# Patient Record
Sex: FEMALE | Race: WHITE | NOT HISPANIC OR LATINO | Employment: UNEMPLOYED | URBAN - METROPOLITAN AREA
[De-identification: names, ages, dates, MRNs, and addresses within clinical notes are randomized per-mention and may not be internally consistent; named-entity substitution may affect disease eponyms.]

---

## 2024-01-31 ENCOUNTER — HOSPITAL ENCOUNTER (EMERGENCY)
Facility: HOSPITAL | Age: 19
Discharge: HOME | End: 2024-01-31
Attending: GENERAL ACUTE CARE HOSPITAL
Payer: MEDICAID

## 2024-01-31 VITALS
HEIGHT: 64 IN | DIASTOLIC BLOOD PRESSURE: 95 MMHG | WEIGHT: 262 LBS | SYSTOLIC BLOOD PRESSURE: 139 MMHG | TEMPERATURE: 98 F | OXYGEN SATURATION: 100 % | HEART RATE: 98 BPM | BODY MASS INDEX: 44.73 KG/M2 | RESPIRATION RATE: 16 BRPM

## 2024-01-31 DIAGNOSIS — S73.101A SPRAIN OF RIGHT HIP, INITIAL ENCOUNTER: ICD-10-CM
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
## 2024-01-31 DIAGNOSIS — S76.011A HIP STRAIN, RIGHT, INITIAL ENCOUNTER: Primary | ICD-10-CM

## 2024-01-31 PROCEDURE — 99284 EMERGENCY DEPT VISIT MOD MDM: CPT

## 2024-01-31 RX ORDER — METHOCARBAMOL 750 MG/1
1500 TABLET, FILM COATED ORAL 3 TIMES DAILY
Qty: 30 TABLET | Refills: 0 | Status: SHIPPED | OUTPATIENT
Start: 2024-01-31 | End: 2024-02-05

## 2024-01-31 RX ORDER — DICLOFENAC SODIUM 75 MG/1
75 TABLET, DELAYED RELEASE ORAL 2 TIMES DAILY
Qty: 30 TABLET | Refills: 0 | Status: SHIPPED | OUTPATIENT
Start: 2024-01-31 | End: 2024-02-15

## 2024-01-31 NOTE — Clinical Note
"Miranda"Reji Adair was seen and treated in our emergency department on 1/31/2024.  She may return to work on 02/02/2024.       If you have any questions or concerns, please don't hesitate to call.      Franky Novoa, AMANDAP"

## 2024-02-01 NOTE — ED PROVIDER NOTES
Encounter Date: 1/31/2024       History     Chief Complaint   Patient presents with    Fall     Fall yesterday. C/o right hip pain      Patient is a 18-year-old female who presents to the emergency department with complaints of right hip pain.  She reports that this happened after a slip fall at work.  She states she was walking their some water and the legs slipped out from under her extending laterally and she almost did what she describes as the splits.  Patient has history of chronic hip pain from hip dysplasia.  Patient denies any other complaints or associated symptoms at this time.  She reports using some pain medication from her chronic issue which did help last night when the pain began but she took a dose this morning and it did not help.      Review of patient's allergies indicates:   Allergen Reactions    Amoxicillin Hives     History reviewed. No pertinent past medical history.  History reviewed. No pertinent surgical history.  History reviewed. No pertinent family history.  Social History     Tobacco Use    Smoking status: Never    Smokeless tobacco: Never   Substance Use Topics    Alcohol use: Not Currently    Drug use: Never     Review of Systems   Constitutional:  Negative for activity change, appetite change and fever.   HENT:  Negative for congestion, dental problem and sore throat.    Eyes:  Negative for discharge and itching.   Respiratory:  Negative for apnea, chest tightness and shortness of breath.    Cardiovascular:  Negative for chest pain.   Gastrointestinal:  Negative for nausea.   Genitourinary:  Negative for dysuria.   Musculoskeletal:  Positive for arthralgias, gait problem and myalgias. Negative for back pain.   Skin:  Negative for color change and rash.   Neurological:  Negative for weakness.   Hematological:  Does not bruise/bleed easily.   Psychiatric/Behavioral:  Negative for agitation and behavioral problems.    All other systems reviewed and are negative.      Physical Exam      Initial Vitals [01/31/24 1852]   BP Pulse Resp Temp SpO2   (!) 139/95 98 16 98.2 °F (36.8 °C) 100 %      MAP       --         Physical Exam    Nursing note and vitals reviewed.  Constitutional: Vital signs are normal. She appears well-developed and well-nourished.  Non-toxic appearance. She does not have a sickly appearance.   HENT:   Head: Normocephalic and atraumatic.   Eyes: Conjunctivae, EOM and lids are normal. Lids are everted and swept, no foreign bodies found.   Neck: Trachea normal and phonation normal. Neck supple. No thyroid mass and no thyromegaly present.   Normal range of motion.   Full passive range of motion without pain.     Cardiovascular:  Normal rate, regular rhythm, S1 normal, S2 normal, normal heart sounds, intact distal pulses and normal pulses.           Pulmonary/Chest: No respiratory distress.   Musculoskeletal:         General: Tenderness present. No edema.      Cervical back: Full passive range of motion without pain, normal range of motion and neck supple.      Comments: Patient is a tender with light palpation along the muscles of the right upper thigh around the hip and does have guarded range of motion but can extend the leg straight from the bed around 60°.  Right lateral extension of the knee causes the most of her pain.      Lymphadenopathy:     She has no cervical adenopathy.   Neurological: She is alert and oriented to person, place, and time. She has normal strength.   Skin: Skin is warm, dry and intact. Capillary refill takes less than 2 seconds.   Psychiatric: She has a normal mood and affect. Her speech is normal and behavior is normal. Judgment normal. Cognition and memory are normal.         ED Course   Procedures  Labs Reviewed - No data to display       Imaging Results              X-Ray Hip 2 or 3 views Right (with Pelvis when performed) (Preliminary result)  Result time 01/31/24 20:03:35      Wet Read by Franky Novoa FNP (01/31/24 20:03:35, [facility] Emergency Dept, Emergency Medicine)    Wet read:  No acute obvious bony abnormality fracture deformity seen on plain film x-ray.  Pending radiologist's review.                                     Medications - No data to display  Medical Decision Making  Patient is a 18-year-old female who presents to the emergency department with complaints of right hip pain.  She reports that this happened after a slip fall at work.  She states she was walking their some water and the legs slipped out from under her extending laterally and she almost did what she describes as the splits.  Patient has history of chronic hip pain from hip dysplasia.  Patient denies any other complaints or associated symptoms at this time.  She reports using some pain medication from her chronic issue which did help last night when the pain began but she took a dose this morning and it did not help.        Problems Addressed:  Sprain of right hip, initial encounter: acute illness or injury     Details: Patient is given IM medication and muscle relaxers to take at home for pain.  Patient had x-ray done here which shows no fracture but she does obviously have chronic right hip that is going to require surgery.  Discussed follow-up with PCP which she had so we did refer her to run.  Strict ED return precautions discussed for any change or worsening symptoms.    Amount and/or Complexity of Data Reviewed  Radiology: ordered and independent interpretation performed.    Risk  Prescription drug management.                                      Clinical Impression:  Final diagnoses:  [W19.XXXA] Fall  [S76.011A] Hip strain, right, initial encounter (Primary)  [S73.101A] Sprain of right hip, initial encounter          ED Disposition Condition    Discharge Stable          ED Prescriptions       Medication Sig Dispense Start Date End Date Auth. Provider    diclofenac (VOLTAREN) 75 MG EC tablet Take 1 tablet (75 mg total) by mouth 2 (two) times daily. for  15 days 30 tablet 1/31/2024 2/15/2024 Franky Novoa FNP    methocarbamoL (ROBAXIN) 750 MG Tab Take 2 tablets (1,500 mg total) by mouth 3 (three) times daily. for 5 days 30 tablet 1/31/2024 2/5/2024 Franky Novoa FNP          Follow-up Information       Follow up With Specialties Details Why Contact Info    Dea Cullen FNP Family Medicine, Emergency Medicine Schedule an appointment as soon as possible for a visit in 3 days For ER Follow Up. [address] [place] [address]  [phone]               Franky Novoa FNP  01/31/24 2008

## 2024-02-01 NOTE — ED PROVIDER NOTES
Encounter Date: 1/31/2024       History     Chief Complaint   Patient presents with    Fall     Fall yesterday. C/o right hip pain      HPI  Review of patient's allergies indicates:   Allergen Reactions    Amoxicillin Hives     History reviewed. No pertinent past medical history.  History reviewed. No pertinent surgical history.  History reviewed. No pertinent family history.  Social History     Tobacco Use    Smoking status: Never    Smokeless tobacco: Never   Substance Use Topics    Alcohol use: Not Currently    Drug use: Never     Review of Systems    Physical Exam     Initial Vitals [01/31/24 1852]   BP Pulse Resp Temp SpO2   (!) 139/95 98 16 98.2 °F (36.8 °C) 100 %      MAP       --         Physical Exam    ED Course   Procedures  Labs Reviewed - No data to display       Imaging Results    None          Medications - No data to display  Medical Decision Making  Amount and/or Complexity of Data Reviewed  Radiology: ordered.                                      Clinical Impression:  Final diagnoses:  [W19.XXXA] Fall

## 2024-08-08 NOTE — PROGRESS NOTES
SUBJECTIVE:  Miranda Adair is a 19 y.o. female here accompanied by friends to establish care.    HPI  Patient presents to establish care. Wants referral to gynecologist for check up. Wants to restart depression meds. Celexa used in the past without good results.  Last pap done in [place] at [facility] and was normal. Will request records. Needs Lipid panel. Needs Hep C and HIV screening. Patient is fasting. Due for tetanus and HPV vaccines-$0.   She is with headaches daily. She does describe them migraines, one side.d She is with nausea and light sensitivity. She denies relief with ibuprofen. She is with dizziness, about every other day. Occurs randomly. She does feel like she will pass out. She is with increase in blurry vision and tunnel vision. She denies abnormal vision exam. She denies hearing problems. She is with increase pain with wisdom tooth. She is need of dentist. She denies trouble swallowing. She denies shortness of breath. She does smoke cigarettes and vape daily. She denies chest pain or heart palpitations. She is with recent episodes of diarrhea and vomiting, resulting in ER visit with no abnormal findings. She is with ongoing increase in bowel movements, about 3 stools with no formed stool. She does have history of constipation. She is also with bleeding with hemorrhoid. She states having abnormal cycles, paps, and currently nexplanon. She is with ongoing bleeding weeks at a time. She is with lower quadrant pain that occurs randomly. She is with major depression. She did take celexa in past with no relief. She is wanting mental health referral.     Miranda's allergies, medications, history, and problem list were updated as appropriate.    Review of Systems   Constitutional:  Positive for fatigue.   Gastrointestinal:  Positive for abdominal pain, blood in stool, diarrhea and nausea.   Psychiatric/Behavioral:  Positive for dysphoric mood.       A comprehensive review of symptoms  "was completed and negative except as noted above.    OBJECTIVE:  Vital signs  Vitals:    08/12/24 1042   BP: 116/76   BP Location: Left arm   Patient Position: Sitting   Pulse: 102   Resp: 18   Temp: 98.2 °F (36.8 °C)   TempSrc: Temporal   SpO2: 97%   Weight: 117.5 kg (259 lb)   Height: 5' 4" (1.626 m)        Physical Exam  Vitals and nursing note reviewed.   Constitutional:       Appearance: Normal appearance.   HENT:      Head: Normocephalic and atraumatic.      Right Ear: Tympanic membrane, ear canal and external ear normal.      Left Ear: Tympanic membrane, ear canal and external ear normal.      Nose: Nose normal.      Mouth/Throat:      Mouth: Mucous membranes are moist.      Pharynx: Oropharynx is clear.   Eyes:      Extraocular Movements: Extraocular movements intact.      Conjunctiva/sclera: Conjunctivae normal.      Pupils: Pupils are equal, round, and reactive to light.   Cardiovascular:      Rate and Rhythm: Normal rate and regular rhythm.      Pulses: Normal pulses.      Heart sounds: Normal heart sounds.   Pulmonary:      Effort: Pulmonary effort is normal.      Breath sounds: Normal breath sounds.   Abdominal:      General: Abdomen is flat. Bowel sounds are normal.      Palpations: Abdomen is soft.   Musculoskeletal:         General: Normal range of motion.      Cervical back: Normal range of motion.   Skin:     General: Skin is warm and dry.      Capillary Refill: Capillary refill takes less than 2 seconds.   Neurological:      General: No focal deficit present.      Mental Status: She is alert.   Psychiatric:         Mood and Affect: Mood normal.         Behavior: Behavior normal.         Thought Content: Thought content normal.         Judgment: Judgment normal.        Office Visit on 08/12/2024   Component Date Value Ref Range Status    POC Glucose 08/12/2024 89  70 - 110 MG/DL Final          ASSESSMENT/PLAN:    1. Moderate episode of recurrent major depressive disorder  Assessment & " Plan:  Denies suicidal ideations   Willing to follow with mental health   Failed celexa in past would like to start wellbutrin       Orders:  -     buPROPion (WELLBUTRIN XL) 150 MG TB24 tablet; Take 1 tablet (150 mg total) by mouth once daily.  Dispense: 30 tablet; Refill: 11  -     Ambulatory referral/consult to Behavioral Health; Future; Expected date: 08/19/2024    2. Fatigue, unspecified type  -     POCT Glucose, Hand-Held Device  -     Vitamin D    3. Bipolar affective disorder, remission status unspecified  Assessment & Plan:  Passed diagnosis not currently taking mood stabilizer   Referral to [facility]     Orders:  -     Ambulatory referral/consult to Behavioral Health; Future; Expected date: 08/19/2024    4. PTSD (post-traumatic stress disorder)  -     Ambulatory referral/consult to Behavioral Health; Future; Expected date: 08/19/2024    5. Chronic migraine without aura without status migrainosus, not intractable  Assessment & Plan:  Nurtec samples given     Orders:  -     rimegepant (NURTEC) 75 mg odt; Take 1 tablet (75 mg total) by mouth once as needed for Migraine. Place ODT tablet on the tongue; alternatively the ODT tablet may be placed under the tongue  Dispense: 2 tablet; Refill: 0    6. Long term current use of therapeutic drug  -     CBC Auto Differential  -     Comprehensive Metabolic Panel  -     Lipid Panel  -     TSH  -     Vitamin D  -     Hemoglobin A1C    7. Chronic diarrhea    8. Screening for chlamydial disease  -     Chlamydia/GC, PCR    9. Menstrual abnormality  Assessment & Plan:  States history of ovarian cancer as well as diagnosis on problem list however in review past visit I do not see any results from pathology or imaging or evidence regarding diagnosis. She states might have been hpv positive on past pap as well as cyst. Will refer to gyn for further clarity       10. History of ovarian cancer  -     Ambulatory referral/consult to Obstetrics / Gynecology; Future; Expected date:  08/19/2024          Recent Results (from the past 24 hour(s))   POCT Glucose, Hand-Held Device    Collection Time: 08/12/24 10:50 AM   Result Value Ref Range    POC Glucose 89 70 - 110 MG/DL       Follow Up:  Follow up in about 2 weeks (around 8/26/2024) for Medication follow up.      Discussed the diagnosis, prognosis, plan of care, chronic nature of and indications for, risks and benefits of treatment for conditions.  Continue all medications as prescribed unless otherwise noted.   Call if patient develops other symptoms or if not better in 2-3 days and sooner if worse. Emphasized the importance of compliance with all recommendations, including medication use and timely follow up. Instructed to return as noted be or sooner if patient develops any other problems or if there are any other additional questions or concerns.

## 2024-08-12 ENCOUNTER — OFFICE VISIT (OUTPATIENT)
Dept: FAMILY MEDICINE | Facility: CLINIC | Age: 19
End: 2024-08-12
Payer: MEDICAID

## 2024-08-12 VITALS
RESPIRATION RATE: 18 BRPM | SYSTOLIC BLOOD PRESSURE: 116 MMHG | WEIGHT: 259 LBS | HEART RATE: 102 BPM | HEIGHT: 64 IN | BODY MASS INDEX: 44.22 KG/M2 | DIASTOLIC BLOOD PRESSURE: 76 MMHG | OXYGEN SATURATION: 97 % | TEMPERATURE: 98 F

## 2024-08-12 DIAGNOSIS — F33.1 MODERATE EPISODE OF RECURRENT MAJOR DEPRESSIVE DISORDER: Primary | ICD-10-CM

## 2024-08-12 DIAGNOSIS — F31.9 BIPOLAR AFFECTIVE DISORDER, REMISSION STATUS UNSPECIFIED: ICD-10-CM

## 2024-08-12 DIAGNOSIS — N92.6 MENSTRUAL ABNORMALITY: ICD-10-CM

## 2024-08-12 DIAGNOSIS — F43.10 PTSD (POST-TRAUMATIC STRESS DISORDER): ICD-10-CM

## 2024-08-12 DIAGNOSIS — Z85.43 HISTORY OF OVARIAN CANCER: ICD-10-CM

## 2024-08-12 DIAGNOSIS — K52.9 CHRONIC DIARRHEA: ICD-10-CM

## 2024-08-12 DIAGNOSIS — Z11.8 SCREENING FOR CHLAMYDIAL DISEASE: ICD-10-CM

## 2024-08-12 DIAGNOSIS — R53.83 FATIGUE, UNSPECIFIED TYPE: ICD-10-CM

## 2024-08-12 DIAGNOSIS — Z79.899 LONG TERM CURRENT USE OF THERAPEUTIC DRUG: ICD-10-CM

## 2024-08-12 DIAGNOSIS — G43.709 CHRONIC MIGRAINE WITHOUT AURA WITHOUT STATUS MIGRAINOSUS, NOT INTRACTABLE: ICD-10-CM

## 2024-08-12 LAB
ALBUMIN SERPL-MCNC: 4.2 G/DL (ref 3.4–5)
ALBUMIN/GLOB SERPL: 1.3 RATIO
ALP SERPL-CCNC: 98 UNIT/L (ref 50–144)
ALT SERPL-CCNC: 27 UNIT/L (ref 1–45)
ANION GAP SERPL CALC-SCNC: 10 MEQ/L (ref 2–13)
AST SERPL-CCNC: 27 UNIT/L (ref 14–36)
BASOPHILS # BLD AUTO: 0.04 X10(3)/MCL (ref 0.01–0.08)
BASOPHILS NFR BLD AUTO: 0.5 % (ref 0.1–1.2)
BILIRUB SERPL-MCNC: 0.7 MG/DL (ref 0–1)
BUN SERPL-MCNC: 14 MG/DL (ref 7–20)
CALCIUM SERPL-MCNC: 9.9 MG/DL (ref 8.4–10.2)
CHLORIDE SERPL-SCNC: 108 MMOL/L (ref 98–110)
CHOLEST SERPL-MCNC: 159 MG/DL (ref 0–200)
CO2 SERPL-SCNC: 20 MMOL/L (ref 21–32)
CREAT SERPL-MCNC: 0.62 MG/DL (ref 0.66–1.25)
CREAT/UREA NIT SERPL: 23 (ref 12–20)
EOSINOPHIL # BLD AUTO: 0.15 X10(3)/MCL (ref 0.04–0.36)
EOSINOPHIL NFR BLD AUTO: 1.7 % (ref 0.7–7)
ERYTHROCYTE [DISTWIDTH] IN BLOOD BY AUTOMATED COUNT: 12.3 % (ref 11–14.5)
EST. AVERAGE GLUCOSE BLD GHB EST-MCNC: 91.1 MG/DL (ref 70–115)
GFR SERPLBLD CREATININE-BSD FMLA CKD-EPI: >90 ML/MIN/1.73/M2
GLOBULIN SER-MCNC: 3.3 GM/DL (ref 2–3.9)
GLUCOSE SERPL-MCNC: 89 MG/DL (ref 70–110)
GLUCOSE SERPL-MCNC: 97 MG/DL (ref 70–115)
HBA1C MFR BLD: 4.8 % (ref 4–6)
HCT VFR BLD AUTO: 41.3 % (ref 36–48)
HDLC SERPL-MCNC: 45 MG/DL (ref 40–60)
HGB BLD-MCNC: 14.1 G/DL (ref 11.8–16)
IMM GRANULOCYTES # BLD AUTO: 0.02 X10(3)/MCL (ref 0–0.03)
IMM GRANULOCYTES NFR BLD AUTO: 0.2 % (ref 0–0.5)
LDLC SERPL DIRECT ASSAY-SCNC: 93.5 MG/DL (ref 30–100)
LYMPHOCYTES # BLD AUTO: 2.36 X10(3)/MCL (ref 1.16–3.74)
LYMPHOCYTES NFR BLD AUTO: 27.2 % (ref 20–55)
MCH RBC QN AUTO: 28.4 PG (ref 27–34)
MCHC RBC AUTO-ENTMCNC: 34.1 G/DL (ref 31–37)
MCV RBC AUTO: 83.1 FL (ref 79–99)
MONOCYTES # BLD AUTO: 0.43 X10(3)/MCL (ref 0.24–0.36)
MONOCYTES NFR BLD AUTO: 5 % (ref 4.7–12.5)
NEUTROPHILS # BLD AUTO: 5.67 X10(3)/MCL (ref 1.56–6.13)
NEUTROPHILS NFR BLD AUTO: 65.4 % (ref 37–73)
NRBC BLD AUTO-RTO: 0 %
PLATELET # BLD AUTO: 316 X10(3)/MCL (ref 140–371)
PMV BLD AUTO: 9.3 FL (ref 9.4–12.4)
POTASSIUM SERPL-SCNC: 4.3 MMOL/L (ref 3.5–5.1)
PROT SERPL-MCNC: 7.5 GM/DL (ref 6.3–8.2)
RBC # BLD AUTO: 4.97 X10(6)/MCL (ref 4–5.1)
SODIUM SERPL-SCNC: 138 MMOL/L (ref 136–145)
TRIGL SERPL-MCNC: 70 MG/DL (ref 30–200)
TSH SERPL-ACNC: 1.52 UIU/ML (ref 0.36–3.74)
WBC # BLD AUTO: 8.67 X10(3)/MCL (ref 4–11.5)

## 2024-08-12 PROCEDURE — 1160F RVW MEDS BY RX/DR IN RCRD: CPT | Mod: CPTII,,, | Performed by: NURSE PRACTITIONER

## 2024-08-12 PROCEDURE — 3008F BODY MASS INDEX DOCD: CPT | Mod: CPTII,,, | Performed by: NURSE PRACTITIONER

## 2024-08-12 PROCEDURE — 80061 LIPID PANEL: CPT | Performed by: NURSE PRACTITIONER

## 2024-08-12 PROCEDURE — 82962 GLUCOSE BLOOD TEST: CPT | Mod: ,,, | Performed by: NURSE PRACTITIONER

## 2024-08-12 PROCEDURE — 3078F DIAST BP <80 MM HG: CPT | Mod: CPTII,,, | Performed by: NURSE PRACTITIONER

## 2024-08-12 PROCEDURE — 87591 N.GONORRHOEAE DNA AMP PROB: CPT | Performed by: NURSE PRACTITIONER

## 2024-08-12 PROCEDURE — 83036 HEMOGLOBIN GLYCOSYLATED A1C: CPT | Performed by: NURSE PRACTITIONER

## 2024-08-12 PROCEDURE — 99204 OFFICE O/P NEW MOD 45 MIN: CPT | Mod: ,,, | Performed by: NURSE PRACTITIONER

## 2024-08-12 PROCEDURE — 85025 COMPLETE CBC W/AUTO DIFF WBC: CPT | Performed by: NURSE PRACTITIONER

## 2024-08-12 PROCEDURE — 3074F SYST BP LT 130 MM HG: CPT | Mod: CPTII,,, | Performed by: NURSE PRACTITIONER

## 2024-08-12 PROCEDURE — 84443 ASSAY THYROID STIM HORMONE: CPT | Performed by: NURSE PRACTITIONER

## 2024-08-12 PROCEDURE — 82306 VITAMIN D 25 HYDROXY: CPT | Performed by: NURSE PRACTITIONER

## 2024-08-12 PROCEDURE — 87491 CHLMYD TRACH DNA AMP PROBE: CPT | Performed by: NURSE PRACTITIONER

## 2024-08-12 PROCEDURE — 1159F MED LIST DOCD IN RCRD: CPT | Mod: CPTII,,, | Performed by: NURSE PRACTITIONER

## 2024-08-12 PROCEDURE — 80053 COMPREHEN METABOLIC PANEL: CPT | Performed by: NURSE PRACTITIONER

## 2024-08-12 RX ORDER — RIMEGEPANT SULFATE 75 MG/75MG
75 TABLET, ORALLY DISINTEGRATING ORAL ONCE AS NEEDED
Qty: 2 TABLET | Refills: 0 | COMMUNITY
Start: 2024-08-12 | End: 2024-08-12

## 2024-08-12 RX ORDER — BUPROPION HYDROCHLORIDE 150 MG/1
150 TABLET ORAL DAILY
Qty: 30 TABLET | Refills: 11 | Status: SHIPPED | OUTPATIENT
Start: 2024-08-12 | End: 2025-08-12

## 2024-08-12 RX ORDER — CITALOPRAM 20 MG/1
20 TABLET, FILM COATED ORAL DAILY
COMMUNITY
Start: 2024-07-10 | End: 2024-08-12

## 2024-08-12 RX ORDER — ETONOGESTREL 68 MG/1
68 IMPLANT SUBCUTANEOUS ONCE
COMMUNITY
Start: 2023-04-18 | End: 2026-04-20

## 2024-08-12 NOTE — ASSESSMENT & PLAN NOTE
Denies suicidal ideations   Willing to follow with mental health   Failed celexa in past would like to start wellbutrin

## 2024-08-12 NOTE — ASSESSMENT & PLAN NOTE
States history of ovarian cancer as well as diagnosis on problem list however in review past visit I do not see any results from pathology or imaging or evidence regarding diagnosis. She states might have been hpv positive on past pap as well as cyst. Will refer to gyn for further clarity

## 2024-08-13 LAB
25(OH)D3+25(OH)D2 SERPL-MCNC: 29 NG/ML (ref 30–80)
C TRACH DNA SPEC QL NAA+PROBE: NOT DETECTED
N GONORRHOEA DNA SPEC QL NAA+PROBE: NOT DETECTED
SOURCE (OHS): NORMAL

## 2024-08-15 ENCOUNTER — TELEPHONE (OUTPATIENT)
Dept: FAMILY MEDICINE | Facility: CLINIC | Age: 19
End: 2024-08-15
Payer: MEDICAID

## 2024-08-15 NOTE — TELEPHONE ENCOUNTER
----- Message from CAMPOS Laws sent at 8/15/2024  6:55 AM CDT -----  Notify vitamin d slightly low. Would benefit from vitamin d 1000 daily. Cmp good. Cbc good. Negative honorrhea and chlamydia. Thyroid normal. Cholesterol good. A1c normal. Recheck labs in one year.

## 2024-08-15 NOTE — TELEPHONE ENCOUNTER
Pt notifed. She will start vit d but will get it over the counter. Pt concerned about [facility] not havin her wellbutrin. I called [facility] and they have the medicine and is ready for  - I called pt and let her know that.

## 2024-08-22 NOTE — PROGRESS NOTES
"SUBJECTIVE:  Miranda Adair is a 19 y.o. female here accompanied by spouse for med change follow up.    HPI  Patient presents for 2 week follow up on medications. Started Nurtec and Wellbutrin last visit. States Nurtec worked really well for headaches. Wellbutrin not working well. States felt no change in mood. Due for pap smear. Scheduled with Peggy Carrizales 9/11/24. Scheduled with therapist 9/13/24. Wants pg test. Discuss blood pressure.  She is with near syncope occurring about 3 or 4 times a week. She did check blood pressure with last episode and elevated to 135/110 and again then next time with diastolic 111. Heart rate elevated up to 114. It was morning time, she did consume breakfast. Occurred while changing daughter diaper. She denies checking without symptoms. She did have in high school and worsened after child birth. She denies seeing cardiologist in past. She would feel more comfortable with cardiac eval. Would like to check for insulin resistance.     [place] allergies, medications, history, and problem list were updated as appropriate.    Review of Systems   Neurological:  Positive for light-headedness and headaches.   Psychiatric/Behavioral:  The patient is nervous/anxious.       A comprehensive review of symptoms was completed and negative except as noted above.    OBJECTIVE:  Vital signs  Vitals:    08/26/24 0900   BP: 119/77   BP Location: Right arm   Patient Position: Sitting   Pulse: 97   Resp: 18   Temp: 98.4 °F (36.9 °C)   TempSrc: Temporal   SpO2: 98%   Weight: 119.2 kg (262 lb 12.8 oz)   Height: 5' 4" (1.626 m)        Physical Exam  Vitals and nursing note reviewed.   Constitutional:       Appearance: Normal appearance.   HENT:      Head: Normocephalic and atraumatic.      Right Ear: Tympanic membrane, ear canal and external ear normal.      Left Ear: Tympanic membrane, ear canal and external ear normal.      Nose: Nose normal.      Mouth/Throat:      Mouth: Mucous membranes are moist.    "   Pharynx: Oropharynx is clear.   Eyes:      Extraocular Movements: Extraocular movements intact.      Conjunctiva/sclera: Conjunctivae normal.      Pupils: Pupils are equal, round, and reactive to light.   Cardiovascular:      Rate and Rhythm: Normal rate and regular rhythm.      Pulses: Normal pulses.      Heart sounds: Normal heart sounds.   Pulmonary:      Effort: Pulmonary effort is normal.      Breath sounds: Normal breath sounds.   Abdominal:      General: Abdomen is flat. Bowel sounds are normal.      Palpations: Abdomen is soft.   Musculoskeletal:         General: Normal range of motion.      Cervical back: Normal range of motion.   Skin:     General: Skin is warm and dry.      Capillary Refill: Capillary refill takes less than 2 seconds.   Neurological:      General: No focal deficit present.      Mental Status: She is alert.   Psychiatric:         Mood and Affect: Mood normal.         Behavior: Behavior normal.         Thought Content: Thought content normal.         Judgment: Judgment normal.          Office Visit on 08/26/2024   Component Date Value Ref Range Status    POC Preg Test, Ur 08/26/2024 Negative  Negative Final     Acceptable 08/26/2024 Yes   Final          ASSESSMENT/PLAN:    1. Moderate episode of recurrent major depressive disorder  Assessment & Plan:  Has appointment scheduled with AYDE  Continue wellbutrin and will discuss increasing or change if no improvement by next visit      2. Irregular menstrual cycle  -     POCT urine pregnancy    3. Chronic migraine without aura without status migrainosus, not intractable  Assessment & Plan:  Nurtec with improvement but daily consistent headaches that progress to migraines  Will send for quilipta daily     Orders:  -     atogepant (QULIPTA) 60 mg Tab; Take 1 tablet (60 mg total) by mouth once daily.  Dispense: 30 tablet; Refill: 0    4. Near syncope  Assessment & Plan:  Would like cardiac work up     Orders:  -     Insulin,  Random    5. Need for hepatitis C screening test  -     [place] GENERIC ORDERABLE [facility] RN    6. Encounter for screening for HIV  -     HIV 1/2 Ag/Ab (4th Gen)          Recent Results (from the past 24 hour(s))   POCT urine pregnancy    Collection Time: 08/26/24  9:14 AM   Result Value Ref Range    POC Preg Test, Ur Negative Negative     Acceptable Yes        Follow Up:  Follow up in about 4 weeks (around 9/23/2024) for Medication follow up.    If a referral was sent and you are not notified and scheduled with specialist within 2 weeks, please notify office to ensure specialty appointment is scheduled in a timely manner.   Discussed the diagnosis, prognosis, plan of care, chronic nature of and indications for, risks and benefits of treatment for conditions.  Continue all medications as prescribed unless otherwise noted.   Call if patient develops other symptoms or if not better in 2-3 days and sooner if worse. Emphasized the importance of compliance with all recommendations, including medication use and timely follow up. Instructed to return as noted be or sooner if patient develops any other problems or if there are any other additional questions or concerns.

## 2024-08-26 ENCOUNTER — OFFICE VISIT (OUTPATIENT)
Dept: FAMILY MEDICINE | Facility: CLINIC | Age: 19
End: 2024-08-26
Payer: MEDICAID

## 2024-08-26 VITALS
HEIGHT: 64 IN | WEIGHT: 262.81 LBS | RESPIRATION RATE: 18 BRPM | BODY MASS INDEX: 44.87 KG/M2 | SYSTOLIC BLOOD PRESSURE: 119 MMHG | HEART RATE: 97 BPM | TEMPERATURE: 98 F | OXYGEN SATURATION: 98 % | DIASTOLIC BLOOD PRESSURE: 77 MMHG

## 2024-08-26 DIAGNOSIS — F33.1 MODERATE EPISODE OF RECURRENT MAJOR DEPRESSIVE DISORDER: Primary | ICD-10-CM

## 2024-08-26 DIAGNOSIS — G43.709 CHRONIC MIGRAINE WITHOUT AURA WITHOUT STATUS MIGRAINOSUS, NOT INTRACTABLE: ICD-10-CM

## 2024-08-26 DIAGNOSIS — Z11.4 ENCOUNTER FOR SCREENING FOR HIV: ICD-10-CM

## 2024-08-26 DIAGNOSIS — R55 NEAR SYNCOPE: ICD-10-CM

## 2024-08-26 DIAGNOSIS — N92.6 IRREGULAR MENSTRUAL CYCLE: ICD-10-CM

## 2024-08-26 DIAGNOSIS — Z11.59 NEED FOR HEPATITIS C SCREENING TEST: ICD-10-CM

## 2024-08-26 LAB
B-HCG UR QL: NEGATIVE
CTP QC/QA: YES
HIV 1+2 AB+HIV1 P24 AG SERPL QL IA: NONREACTIVE

## 2024-08-26 PROCEDURE — 3044F HG A1C LEVEL LT 7.0%: CPT | Mod: CPTII,,, | Performed by: NURSE PRACTITIONER

## 2024-08-26 PROCEDURE — 3078F DIAST BP <80 MM HG: CPT | Mod: CPTII,,, | Performed by: NURSE PRACTITIONER

## 2024-08-26 PROCEDURE — 81025 URINE PREGNANCY TEST: CPT | Mod: ,,, | Performed by: NURSE PRACTITIONER

## 2024-08-26 PROCEDURE — 99214 OFFICE O/P EST MOD 30 MIN: CPT | Mod: ,,, | Performed by: NURSE PRACTITIONER

## 2024-08-26 PROCEDURE — 1160F RVW MEDS BY RX/DR IN RCRD: CPT | Mod: CPTII,,, | Performed by: NURSE PRACTITIONER

## 2024-08-26 PROCEDURE — 86803 HEPATITIS C AB TEST: CPT | Performed by: NURSE PRACTITIONER

## 2024-08-26 PROCEDURE — 3008F BODY MASS INDEX DOCD: CPT | Mod: CPTII,,, | Performed by: NURSE PRACTITIONER

## 2024-08-26 PROCEDURE — 3074F SYST BP LT 130 MM HG: CPT | Mod: CPTII,,, | Performed by: NURSE PRACTITIONER

## 2024-08-26 PROCEDURE — 1159F MED LIST DOCD IN RCRD: CPT | Mod: CPTII,,, | Performed by: NURSE PRACTITIONER

## 2024-08-26 PROCEDURE — 87389 HIV-1 AG W/HIV-1&-2 AB AG IA: CPT | Performed by: NURSE PRACTITIONER

## 2024-08-26 PROCEDURE — 83525 ASSAY OF INSULIN: CPT | Performed by: NURSE PRACTITIONER

## 2024-08-26 RX ORDER — ATOGEPANT 60 MG/1
60 TABLET ORAL DAILY
Qty: 30 TABLET | Refills: 0 | Status: SHIPPED | OUTPATIENT
Start: 2024-08-26 | End: 2024-09-25

## 2024-08-26 RX ORDER — RIMEGEPANT SULFATE 75 MG/75MG
75 TABLET, ORALLY DISINTEGRATING ORAL ONCE AS NEEDED
COMMUNITY

## 2024-08-26 NOTE — ASSESSMENT & PLAN NOTE
Has appointment scheduled with AYDE  Continue wellbutrin and will discuss increasing or change if no improvement by next visit

## 2024-08-26 NOTE — ASSESSMENT & PLAN NOTE
Nurtec with improvement but daily consistent headaches that progress to migraines  Will send for quilipta daily

## 2024-08-27 LAB
INSULIN P FAST SERPL-ACNC: 27.6 MCIU/ML (ref 2.6–24.9)
MAYO GENERIC ORDERABLE RESULT: NORMAL

## 2024-08-29 ENCOUNTER — TELEPHONE (OUTPATIENT)
Dept: FAMILY MEDICINE | Facility: CLINIC | Age: 19
End: 2024-08-29
Payer: MEDICAID

## 2024-08-29 NOTE — TELEPHONE ENCOUNTER
----- Message from CAMPOS Laws sent at 8/29/2024  6:57 AM CDT -----  Notify insulin slightly elevated at 27.6. Can schedule follow up to discuss metformin or lifestyle modifications. Hep c negative. Hiv non reactive.

## 2024-08-29 NOTE — TELEPHONE ENCOUNTER
Pt notified. After speaking with Yessi. She would like to get her in sooner then 9/26 to discuss medication treatment.

## 2024-09-04 NOTE — PROGRESS NOTES
"SUBJECTIVE:  Miranda Adair is a 19 y.o. female here for migraines    HPI  Patient in clinic with follow-up on migraines. Patient was prescribed qulipta. Patient states that she continues to have a few headaches off and on. But is able to take Ibuprofen with the quilipta and Headaches go away.ongonig nausea for about 5 years that has been on and off but progressively worsened in past year. She is now with nausea with every meal or food intake. She is vomiting self induced because nausea is so extreme. She was using Zofran once daily with some relief. She is now with constipation, diarrhea improved. She is taking colace. She is consuming sandwhiches during the day. She does snack on pickles. She does eat supper at night that consist of variety of baked and grilled foods. She does not find any difference with difference intake. She tried to avoid pasta. She has tried gluten free diet.      Marilus allergies, medications, history, and problem list were updated as appropriate.    Review of Systems   Gastrointestinal:  Positive for nausea.      A comprehensive review of symptoms was completed and negative except as noted above.    OBJECTIVE:  Vital signs  Vitals:    09/05/24 1359   BP: 122/70   BP Location: Left arm   Patient Position: Sitting   Pulse: 79   Resp: 20   Temp: 98.4 °F (36.9 °C)   SpO2: 98%   Weight: 118.8 kg (262 lb)   Height: 5' 4" (1.626 m)        Physical Exam  Vitals and nursing note reviewed.   Constitutional:       Appearance: Normal appearance.   HENT:      Head: Normocephalic and atraumatic.      Right Ear: Tympanic membrane, ear canal and external ear normal.      Left Ear: Tympanic membrane, ear canal and external ear normal.      Nose: Nose normal.      Mouth/Throat:      Mouth: Mucous membranes are moist.      Pharynx: Oropharynx is clear.   Eyes:      Extraocular Movements: Extraocular movements intact.      Conjunctiva/sclera: Conjunctivae normal.      Pupils: Pupils are equal, round, and " reactive to light.   Cardiovascular:      Rate and Rhythm: Normal rate and regular rhythm.      Pulses: Normal pulses.      Heart sounds: Normal heart sounds.   Pulmonary:      Effort: Pulmonary effort is normal.      Breath sounds: Normal breath sounds.   Abdominal:      General: Abdomen is flat. Bowel sounds are normal.      Palpations: Abdomen is soft.      Tenderness: There is abdominal tenderness in the right lower quadrant, suprapubic area and left lower quadrant.   Musculoskeletal:         General: Normal range of motion.      Cervical back: Normal range of motion.   Skin:     General: Skin is warm and dry.      Capillary Refill: Capillary refill takes less than 2 seconds.   Neurological:      General: No focal deficit present.      Mental Status: She is alert.   Psychiatric:         Mood and Affect: Mood normal.         Behavior: Behavior normal.         Thought Content: Thought content normal.         Judgment: Judgment normal.          No visits with results within 1 Day(s) from this visit.   Latest known visit with results is:   Office Visit on 08/26/2024   Component Date Value Ref Range Status    POC Preg Test, Ur 08/26/2024 Negative  Negative Final     Acceptable 08/26/2024 Yes   Final    HIV 08/26/2024 Nonreactive  Nonreactive Final    Teague Generic Orderable 08/26/2024 SEE COMMENTS   Final       Test                             Result           Flag  Unit  RefValue  ----------------------------------------------------------------------  HCV Ab Scrn w/Reflex to HCV PCR, S    HCV Ab Screen, S               Negative                     Negative  Consumption of high-dose biotin supplement within 12 hours  of blood collection for this test can cause false-negative  results.     Test Performed by:  [facility]  [address] [place] [address]  : Brenden Blair Ph.D.; CLIA# [id number]    Insulin, S 08/26/2024 27.6 (H)  2.6 -  24.9 mcIU/mL Final       Test Performed by:  [facility]  [address] [place] [address]  : Brenden Blair Ph.D.; CLIA# [id number]          ASSESSMENT/PLAN:    1. Gastroesophageal reflux disease without esophagitis  Assessment & Plan:  Ongoing vomiting, nausea, and gerd  Begin prontonix daily and carafate before meals and bed   Will consider scope if no improvement         2. Constipation, unspecified constipation type  Assessment & Plan:  Begin miralax daily      3. Nausea    4. Chronic migraine without aura without status migrainosus, not intractable  Assessment & Plan:  Improved            No results found for this or any previous visit (from the past 24 hour(s)).    Follow Up:  Follow up in about 4 weeks (around 10/3/2024) for Chronic Conditions.    If a referral was sent and you are not notified and scheduled with specialist within 2 weeks, please notify office to ensure specialty appointment is scheduled in a timely manner.   Discussed the diagnosis, prognosis, plan of care, chronic nature of and indications for, risks and benefits of treatment for conditions.  Continue all medications as prescribed unless otherwise noted.   Call if patient develops other symptoms or if not better in 2-3 days and sooner if worse. Emphasized the importance of compliance with all recommendations, including medication use and timely follow up. Instructed to return as noted be or sooner if patient develops any other problems or if there are any other additional questions or concerns.

## 2024-09-05 ENCOUNTER — OFFICE VISIT (OUTPATIENT)
Dept: FAMILY MEDICINE | Facility: CLINIC | Age: 19
End: 2024-09-05
Payer: MEDICAID

## 2024-09-05 VITALS
BODY MASS INDEX: 44.73 KG/M2 | RESPIRATION RATE: 20 BRPM | TEMPERATURE: 98 F | WEIGHT: 262 LBS | OXYGEN SATURATION: 98 % | SYSTOLIC BLOOD PRESSURE: 122 MMHG | HEART RATE: 79 BPM | DIASTOLIC BLOOD PRESSURE: 70 MMHG | HEIGHT: 64 IN

## 2024-09-05 DIAGNOSIS — K21.9 GASTROESOPHAGEAL REFLUX DISEASE WITHOUT ESOPHAGITIS: Primary | ICD-10-CM

## 2024-09-05 DIAGNOSIS — G43.709 CHRONIC MIGRAINE WITHOUT AURA WITHOUT STATUS MIGRAINOSUS, NOT INTRACTABLE: ICD-10-CM

## 2024-09-05 DIAGNOSIS — R11.0 NAUSEA: ICD-10-CM

## 2024-09-05 DIAGNOSIS — K59.00 CONSTIPATION, UNSPECIFIED CONSTIPATION TYPE: ICD-10-CM

## 2024-09-05 RX ORDER — ONDANSETRON 4 MG/1
4 TABLET, ORALLY DISINTEGRATING ORAL 2 TIMES DAILY
Qty: 20 TABLET | Refills: 0 | Status: SHIPPED | OUTPATIENT
Start: 2024-09-05

## 2024-09-05 RX ORDER — POLYETHYLENE GLYCOL 3350 17 G/17G
17 POWDER, FOR SOLUTION ORAL DAILY
Qty: 30 EACH | Refills: 0 | Status: SHIPPED | OUTPATIENT
Start: 2024-09-05 | End: 2024-10-05

## 2024-09-05 RX ORDER — SUCRALFATE 1 G/1
1 TABLET ORAL 4 TIMES DAILY
Qty: 56 TABLET | Refills: 0 | Status: SHIPPED | OUTPATIENT
Start: 2024-09-05 | End: 2024-09-19

## 2024-09-05 RX ORDER — PANTOPRAZOLE SODIUM 40 MG/1
40 TABLET, DELAYED RELEASE ORAL DAILY
Qty: 90 TABLET | Refills: 3 | Status: SHIPPED | OUTPATIENT
Start: 2024-09-05 | End: 2025-09-05

## 2024-09-05 NOTE — ASSESSMENT & PLAN NOTE
Ongoing vomiting, nausea, and gerd  Begin prontonix daily and carafate before meals and bed   Will consider scope if no improvement